# Patient Record
Sex: MALE | Race: WHITE
[De-identification: names, ages, dates, MRNs, and addresses within clinical notes are randomized per-mention and may not be internally consistent; named-entity substitution may affect disease eponyms.]

---

## 2018-08-29 ENCOUNTER — HOSPITAL ENCOUNTER (EMERGENCY)
Dept: HOSPITAL 56 - MW.ED | Age: 43
Discharge: HOME | End: 2018-08-29
Payer: COMMERCIAL

## 2018-08-29 VITALS — DIASTOLIC BLOOD PRESSURE: 106 MMHG | SYSTOLIC BLOOD PRESSURE: 164 MMHG
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
## 2018-08-29 DIAGNOSIS — S60.222A: Primary | ICD-10-CM

## 2018-08-29 DIAGNOSIS — Z79.899: ICD-10-CM
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
## 2018-08-29 NOTE — CR
EXAMINATION: Left hand

 

HISTORY: Pain

 

COMPARISON: None

 

TECHNIQUE: 2 views

 

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 The re
maining osseous structures and joint spaces appear preserved. Bone mineralization is otherwise normal
. No foreign body.

 

IMPRESSION: No acute osseous abnormality identified.

## 2018-08-29 NOTE — EDM.PDOC
ED HPI GENERAL MEDICAL PROBLEM





- General


Chief Complaint: Upper Extremity Injury/Pain


Stated Complaint: SMASHED LEFT HAND


Time Seen by Provider: 08/29/18 14:41


Source of Information: Reports: Patient


History Limitations: Reports: No Limitations





- History of Present Illness


INITIAL COMMENTS - FREE TEXT/NARRATIVE: 





HISTORY AND PHYSICAL:





History of present illness:


Patient is a 43-year-old male here with complaint of left hand pain. He states 

that he works for [employer] and they were doing a search warrant last 

night. He states they're using a gualberto to open a door and as he was doing that he 

hit his left hand on the door is open. He is otherwise in his usual state of 

health and denies any nausea, vomiting, diarrhea, fever, chills, chest pain, 

shortness of breath. He has not taken anything OTC for his symptoms.





Review of systems: 


As per history of present illness and below otherwise all systems reviewed and 

negative.





Past medical history: 


As per history of present illness and as reviewed below otherwise 

noncontributory.





Surgical history: 


As per history of present illness and as reviewed below otherwise 

noncontributory.





Social history: 


No reported history of drug or alcohol abuse.





Family history: 


As per history of present illness and as reviewed below otherwise 

noncontributory.





Physical exam:


General: Patient sitting comfortably in no acute distress and nontoxic appearing


HEENT: Atraumatic, normocephalic, pupils reactive, negative for conjunctival 

pallor or scleral icterus, mucous membranes moist, throat clear, neck supple, 

nontender, trachea midline. No meningeal signs. 


Lungs: Clear to auscultation, breath sounds equal bilaterally, chest nontender.


Heart: S1S2, regular, negative for clicks, rubs, or overt murmur.


Extremities: Skin is intact. There is a quarter size area of ecchymosis over 

the distal fourth and fifth metacarpal on the dorsal hand. He has pain to 

palpation in this area. He has normal ROM at MCPs, DIPs, PIPs, and wrist.  

negative for cords or calf pain. Neurovascular unremarkable.


Neuro: Awake, alert, oriented. Cranial nerves II through XII unremarkable. 

Cerebellum unremarkable. Motor and sensory unremarkable throughout. Exam 

nonfocal.





Notes: 





Diagnostics:


Left hand x-ray 





Therapeutics:


None





Prescriptions:


None





Impression: 


Left hand injury/contusion





Plan:


1. Ice, elevate, and take motrin as needed. 


2. Please follow up in the clinic for a work comp follow up. 


3. Return to ED as needed as discussed. 





Definitive disposition and diagnosis as appropriate pending reevaluation and 

review of above.





  ** Left Hand


Pain Score (Numeric/FACES): 3





- Related Data


 Allergies











Allergy/AdvReac Type Severity Reaction Status Date / Time


 


No Known Allergies Allergy   Verified 02/13/17 12:42











Home Meds: 


 Home Meds





Albuterol [Proventil HFA] 6.7 gm INH Q4H #1 inhaler 02/13/17 [Rx]


Mesalamine [Lialda] 1 tab PO DAILY 02/13/17 [History]











Past Medical History


HEENT History: Reports: Sinusitis


Cardiovascular History: Reports: None


Respiratory History: Reports: Asthma


Gastrointestinal History: Reports: Other (See Below)


Other Gastrointestinal History: ulcerative colitis


Genitourinary History: Reports: None


Musculoskeletal History: Reports: None


Neurological History: Reports: None


Psychiatric History: Reports: None


Endocrine/Metabolic History: Reports: None


Hematologic History: Reports: None


Immunologic History: Reports: None


Oncologic (Cancer) History: Reports: None


Dermatologic History: Reports: None





- Infectious Disease History


Infectious Disease History: Reports: Chicken Pox





- Past Surgical History


HEENT Surgical History: Reports: Other (See Below)





Social & Family History





- Family History


Family Medical History: Noncontributory





- Caffeine Use


Caffeine Use: Reports: Coffee


Caffeine Use Comment: 3 drinks/day





Review of Systems





- Review of Systems


Review Of Systems: ROS reveals no pertinent complaints other than HPI.





ED EXAM, GENERAL





- Physical Exam


Exam: See Below (see dictation)





Course





- Vital Signs


Last Recorded V/S: 


 Last Vital Signs











Temp  36.7 C   08/29/18 14:32


 


Pulse  82   08/29/18 14:32


 


Resp  18   08/29/18 14:32


 


BP  171/101 H  08/29/18 14:32


 


Pulse Ox  100   08/29/18 14:32














Departure





- Departure


Time of Disposition: 15:18


Disposition: Home, Self-Care 01


Condition: Good


Clinical Impression: 


 Contusion of left hand








- Discharge Information


Referrals: 


PCP,None [Primary Care Provider] - 


Forms:  ED Department Discharge


Additional Instructions: 


The following information is given to patients seen in the emergency department 

who are being discharged to home. This information is to outline your options 

for follow-up care. We provide all patients seen in our emergency department 

with a follow-up referral.





The need for follow-up, as well as the timing and circumstances, are variable 

depending upon the specifics of your emergency department visit.





If you don't have a primary care physician on staff, we will provide you with a 

referral. We always advise you to contact your personal physician following an 

emergency department visit to inform them of the circumstance of the visit and 

for follow-up with them and/or the need for any referrals to a consulting 

specialist.





The emergency department will also refer you to a specialist when appropriate. 

This referral assures that you have the opportunity for follow-up care with a 

specialist. All of these measure are taken in an effort to provide you with 

optimal care, which includes your follow-up.





Under all circumstances we always encourage you to contact your private 

physician who remains a resource for coordinating your care. When calling for 

follow-up care, please make the office aware that this follow-up is from your 

recent emergency room visit. If for any reason you are refused follow-up, 

please contact the [facility] Emergency 

Department at [phone] and asked to speak to the emergency department 

charge nurse.





[facility]


Primary Care


[address] [place] [id number]


Phone: [phone]


Fax: [phone]





[facility]


[address] [place] [address]


Phone: [phone]


Fax: [phone]





1. Ice, elevate, and take motrin as needed. 


2. Please follow up in the clinic for a work comp follow up. 


3. Return to ED as needed as discussed.

## 2020-12-09 NOTE — PCM.OPNOTE
- General Post-Op/Procedure Note


Date of Surgery/Procedure: 12/09/20


Operative Procedure(s): Right knee arthroscopy and partial medial meniscectomy


Findings: 


Lateral compartment: Lateral meniscus intact, minimal grade 1 changes femoral 

and tibial articular cartilage


Notch: Anterior cruciate ligament intact


Medial compartment: Undersurface tear of the medial meniscus posterior third 

body, grade 1-2 cartilage changes posterior aspect of tibial cartilage in the 

region of the meniscal tear, minimal femoral cartilage changes


Patellofemoral compartment: Grade 2 cobblestone changes of the patellar ridge 

and grade 1 changes of the trochlear groove articular cartilage


Pre Op Diagnosis: Right knee medial meniscus tear


Post-Op Diagnosis: Right knee medial meniscus tear


Anesthesia Technique: General ET Tube


Primary Surgeon: Mateusz Colbert


Assistant: Mae Bahena Assistant Was Necessary: 


Positioning


Pathology: 


None


EBL in mLs: 5


Complications: None


Free Text/Narrative:: 


Patient is a 45-year-old male with right knee pain.  He has a documented right 

medial meniscus tear documented by MRI.  We discussed the risks and benefits of 

surgery for knee arthroscopy and partial medial meniscectomy.  All questions 

were answered.  Patient consented to proceed with surgery.





Patient was taken to the operating room.  After adequate general anesthesia, he 

remained in a supine position.  Tourniquet was placed around the right proximal 

thigh.  The right lower extremities prepped draped in usual sterile manner.





The leg was elevated and the tourniquet inflated.  Arthroscopy was performed 

with superior medial drain portal medial and lateral parapatellar arthroscopy 

portals and accessory medial working portals.  The above diagnostic arthroscopy 

findings are noted.





Partial medial meniscectomy was performed with punches and a shaver back to a 

stable rim.  The stable rim was confirmed with probing.





Fluid was expressed from the joint, Marcaine injected into the subcutaneous 

tissue but not the joint, portals were closed with nylon sutures.  Sterile 

dressing was applied.  Patient was accompanied the cover room in stable 

condition.





Pain management: Ibuprofen, acetaminophen, hydrocodone.


Venous thromboembolism prophylaxis: Not indicated for knee arthroscopy.


Prophylactic antibiotics: Not indicated for outpatient procedure.


Restrictions: Patient is weightbearing as tolerated on his right lower extremity

with assistive device as needed.  He has no specific restrictions and may resume

activities as tolerated.  He is off work until Monday, December 14, 2020 when he

may return to work light duty.

## 2020-12-09 NOTE — [id number]
Post Anesthesia Note





- EVALUATION WITHIN 48HRS OF ANESTHETIC


Vital Signs in Normal Range: Yes


Patient Participated in Evaluation: Yes


Respiratory Function Stable: Yes


Airway Patent: Yes


Cardiovascular Function Stable: Yes


Hydration Status Stable: Yes


Pain Control Satisfactory: Yes


Nausea and Vomiting Control Satisfactory: Yes


Mental Status Recovered: Yes


Vital Signs: 


                                Last Vital Signs











Temp  36.4 C   12/09/20 07:53


 


Pulse  56 L  12/09/20 09:58


 


Resp  10 L  12/09/20 09:58


 


BP  124/79   12/09/20 09:58


 


Pulse Ox  95   12/09/20 09:58














- COMMENTS/OBSERVATIONS


Free Text/Narrative:: 





No anesthesia problems

## 2020-12-09 NOTE — PCM.POSTAN
POST ANESTHESIA ASSESSMENT





- MENTAL STATUS


Mental Status: Alert, Oriented





- VITAL SIGNS


Vital Signs: 


                                Last Vital Signs











Temp  36.4 C   12/09/20 07:53


 


Pulse  56 L  12/09/20 09:58


 


Resp  10 L  12/09/20 09:58


 


BP  124/79   12/09/20 09:58


 


Pulse Ox  95   12/09/20 09:58














- RESPIRATORY


Respiratory Status: Respiratory Rate WNL, Airway Patent, O2 Saturation Stable





- CARDIOVASCULAR


CV Status: Pulse Rate WNL, Blood Pressure Stable





- GASTROINTESTINAL


GI Status: No Symptoms





- PAIN


Pain Score: 2





- POST OP HYDRATION


Hydration Status: Adequate & Stable





- OBSERVATIONS


Free Text/Narrative:: 





No anesthesia problems

## 2020-12-09 NOTE — PCM.PREANE
Preanesthetic Assessment





- Anesthesia/Transfusion/Family Hx


Anesthesia History: Prior Anesthesia Without Reaction


Family History of Anesthesia Reaction: No


Transfusion History: No Prior Transfusion(s)


Intubation History: Unknown





- Review of Systems


General: No Symptoms


Pulmonary: No Symptoms


Cardiovascular: No Symptoms


Gastrointestinal: No Symptoms


Neurological: No Symptoms


Other: Reports: None





- Physical Assessment


Height: 6 ft 2 in


Weight: 114.759 kg


ASA Class: 2


Mental Status: Alert & Oriented x3


Airway Class: Mallampati = 2


Dentition: Reports: Normal Dentition


Thyro-Mental Finger Breadths: 3


Mouth Opening Finger Breadths: 3


ROM/Head Extension: Full


Lungs: Clear to Auscultation, Normal Respiratory Effort


Cardiovascular: Regular Rate, Regular Rhythm





- Lab


Values: 





                             Laboratory Last Values











SARS-CoV-2 RNA (BEATA)  NEGATIVE  (NEGATIVE)   12/09/20  06:00    














- Allergies


Allergies/Adverse Reactions: 


                                    Allergies











Allergy/AdvReac Type Severity Reaction Status Date / Time


 


No Known Allergies Allergy   Verified 12/03/20 13:37














- Blood


Blood Available: No





- Anesthesia Plan


Pre-Op Medication Ordered: None





- Acknowledgements


Anesthesia Type Planned: General Anesthesia


Pt an Appropriate Candidate for the Planned Anesthesia: Yes


Alternatives and Risks of Anesthesia Discussed w Pt/Guardian: Yes


Pt/Guardian Understands and Agrees with Anesthesia Plan: Yes





PreAnesthesia Questionnaire


HEENT History: Reports: Sinusitis


Cardiovascular History: Reports: Hypertension


Respiratory History: Reports: Asthma (exercize induced)


Gastrointestinal History: Reports: Other (See Below)


Other Gastrointestinal History: ulcerative colitis


Genitourinary History: Reports: None


Musculoskeletal History: Reports: Fracture


Other Musculoskeletal History: hx fx hand


Neurological History: Reports: Migraines


Psychiatric History: Reports: None


Endocrine/Metabolic History: Reports: Obesity/BMI 30+ (BMI 32.5)


Hematologic History: Reports: None


Immunologic History: Reports: None


Oncologic (Cancer) History: Reports: None


Dermatologic History: Reports: None





- Infectious Disease History


Infectious Disease History: Reports: None





- Past Surgical History


Head Surgeries/Procedures: Reports: None


HEENT Surgical History: Reports: Naso-Sinus Surgery


Cardiovascular Surgical History: Reports: None


Respiratory Surgical History: Reports: None


GI Surgical History: Reports: Other (See Below)


Other GI Surgeries/Procedures: hemorrhoidectomy


Male  Surgical History: Reports: None


Endocrine Surgical History: Reports: None


Neurological Surgical History: Reports: None


Musculoskeletal Surgical History: Reports: None


Oncologic Surgical History: Reports: None


Dermatological Surgical History: Reports: Other (See Below)





- SUBSTANCE USE


Tobacco Use Status *Q: Former Tobacco User


Tobacco Use Within Last Twelve Months: Smokeless Tobacco, Other (See Below)





- HOME MEDS


Home Medications: 


                                    Home Meds





Albuterol [Proventil HFA] 2 puff INH Q4H PRN 07/06/19 [History]


Fluticasone Propionate [Flonase Allergy Relief] 1 spray NASBOTH DAILY 07/06/19 

[History]


Lisinopril 40 mg PO DAILY 07/06/19 [History]


Montelukast Sodium [Singulair] 10 mg PO ASDIRECTED 07/06/19 [History]


Fexofenadine/Pseudoephedrine [Allegra-D 24 Hour Tablet] 1 tab PO ASDIRECTED 

12/03/20 [History]


Zinc Gluconate [Zinc] 1 tab PO DAILY 12/03/20 [History]











- CURRENT (IN HOUSE) MEDS


Current Meds: 





                               Current Medications





Lactated Ringer's (Ringers, Lactated)  1,000 mls @ 100 mls/hr IV ASDIRECTED [facility]


Cefazolin Sodium/Dextrose 2 gm (/ Premix)  50 mls @ 100 mls/hr IV ONCALL [facility]





Discontinued Medications





Fentanyl (Sublimaze) Confirm Administered Dose 100 mcg .ROUTE .STK-MED ONE


   Stop: 12/09/20 07:17


Bupivacaine HCl/Epinephrine Bitart (Sensorc Mpf 0.25%-Epi [id number]) Confirm 

Administered Dose 30 mls @ as directed .ROUTE .STK-MED ONE


   Stop: 12/09/20 07:34


Lidocaine HCl (Xylocaine-Mpf 1%) Confirm Administered Dose 5 ml .ROUTE .STK-MED 

ONE


   Stop: 12/09/20 07:17


Midazolam HCl (Versed 1 Mg/Ml) Confirm Administered Dose 2 mg .ROUTE .STK-MED 

ONE


   Stop: 12/09/20 07:17


Ondansetron HCl (Zofran) Confirm Administered Dose 4 mg .ROUTE .STK-MED ONE


   Stop: 12/09/20 07:17


Propofol (Diprivan  20 Ml) Confirm Administered Dose 200 mg .ROUTE .STK-MED ONE


   Stop: 12/09/20 07:17


Rocuronium Bromide (Rocuronium Bromide) Confirm Administered Dose 50 mg .ROUTE 

.STK-MED ONE


   Stop: 12/09/20 07:17

## 2021-08-11 NOTE — EDM.PDOC
ED HPI GENERAL MEDICAL PROBLEM





- General


Chief Complaint: Syncope


Stated Complaint: SPOKE W/ NURSE


Time Seen by Provider: 08/11/21 17:45





- History of Present Illness


INITIAL COMMENTS - FREE TEXT/NARRATIVE: 





HISTORY AND PHYSICAL:





History of present illness:


This a 46-year-old gentleman with history significant for hypertension who 

presents to the ER today secondary to a witnessed syncopal episode.  Patient was

here with his son in room 9 assisting with a laceration to his son's ankle.  

Patient had assisted by holding his son's hands during  both anesthetizing the 

wound as well as suturing of the wound.  After the wound had been sutured, the 

patient was sitting with his son for approximately 10 minutes when his son 

started screaming for help.  The son reports that his father became 

unresponsive, his eyes rolled up in his head and was twitching up and down a 

little bit.  Patient was sitting in his chair when this occurred next his son.  

He did not fall out of the chair and did not incur any injury or trauma to his 

head.  Nursing staff and myself were immediately present and were able to assist

patient down slowly to the floor where he became more responsive over 

approximately a 30 to 45-second period.  Patient reports that he denies any 

recent fevers, shakes, chills, nausea, vomiting, diarrhea, dysuria, frequency, 

urgency, chest pain, shortness of breath, abdominal pain, pain rating to his 

arms or other back.  Patient reports that he just felt extremely warm and 

flushed and then next he recalls is being on the floor with us around him.  

Patient denies any history of heart disease, CAD, heart failure, strokes.  

Patient denies any history of diabetes, liver, lung, kidney problems.  Patient 

denies any prior abdominal or chest surgeries.  Patient denies any tobacco 

alcohol or drugs but he does chew tobacco.











Review of systems: 


As per history of present illness and below otherwise all systems reviewed and 

negative.





Past medical history: 


As per history of present illness and as reviewed below otherwise 

noncontributory.





Surgical history: 


As per history of present illness and as reviewed below otherwise 

noncontributory.





Social history: 


No reported history of drug abuse.





Family history: 


As per history of present illness and as reviewed below otherwise 

noncontributory.





Physical exam:





This patient was seen and evaluated during the 2020 SARS-CoV-2 novel coronavirus

pandemic period.  Community viral transmission is ongoing at time of this 

encounter and the emergency department is operating under pandemic response 

procedures.





Constitutional: Patient is oriented to person, place, and time.  Appears well-

developed and well-nourished.  No distress.


 HEENT: Moist mucous membranes


 Head: Normocephalic and atraumatic


 Eyes: Right eye exhibits no discharge.  Left eye exhibits no discharge.  No 

scleral icterus


 Neck: Normal range of motion.  No tracheal deviation present.


 Cardiovascular: Normal rate and regular rhythm.  No murmurs gallops or rubs


 Pulmonary: Effort normal, no respiratory distress.  No wheezing rales or 

rhonchi


Abd:  Soft, nondistended, no rebound/guarding, no psoas or obturator signs, no 

tenderness at Mcberney's point, no Richard's sign.  Pt does not present with an 

exam that would be consistent with an acute surgical abdomen at this time


 Musculoskeletal: Normal range of motion


 Neurologic: Alert and oriented to person, place and time.


 Skin: Pink, warm and diaphoretic.


 Psychiatric: Normal mood and affect.  Behavior is normal.  Judgment and thought

content normal.


 Nursing note and vital signs have been reviewed





Patient extremely diaphoretic upon arrival with unresponsive behavior and 

shallow respirations.  This lasted for approximately 30 seconds and patient 

became more alert awake and back to baseline within 60 seconds of the syncopal 

episode.


Diagnostics:


[]





Therapeutics:


[]





Assessment and plan:


This is a 46-year-old gentleman who presents to the ER today with a witnessed 

syncopal episode while he was here in the ED with a son secondary to a 

laceration to his son's left ankle.  Patient reports that he that he did have 1 

episode of syncope several years ago when he saw blood with his mother.  Patient

 reports that today he only had a couple coffee for breakfast and couple apples 

with caramel for lunch.  He reports he did not drink much fluids today or eat 

anything else.





In the ED, the patient's blood sugar was greater than 100.


Patient's blood pressure immediately after the syncopal episode while he was on 

the ground was markedly elevated at approximately 180/110.


Patient's EKG did not reveal any evidence of arrhythmias, STEMI, abnormal 

intervals.  


EKG:





EKG date August 11, 2021 at 5:40 PM


As interpreted by ER physician: Merissa:


Nonspecific ST-T wave abnormalities


Normal axis


No evidence of ST elevation MI


Normal sinus rhythm heart rate of 91





Patient will have labs drawn including a CBC, CMP, electrolytes, troponin.  

Patient was given IV fluids here in the ED and will be reevaluated.








6:46 PM: Patient was reevaluated in the ED.  Patient's labs are all within 

normal limits.  Patient currently is alert awake and orient x3 and has no 

further episodes of diaphoresis.  Patient is ambulating in the ED with stable 

gait.  Patient's blood pressure is 140/85 with a heart rate of 72.  I have given

 the patient the option for admission for further evaluation however he reports 

that he feels extremely comfortable and does not wish to be admitted to the 

hospital and would rather be discharged home.  This is most likely a vasovagal 

episode secondary to a combination of decreased p.o. intake today, dehydration, 

and the situation with his son getting sutured with blood exposure.





Definitive disposition and diagnosis as appropriate pending reevaluation and 

review of above.











- Related Data


                                    Allergies











Allergy/AdvReac Type Severity Reaction Status Date / Time


 


No Known Allergies Allergy   Verified 08/11/21 17:46











Home Meds: 


                                    Home Meds





Albuterol [Proventil HFA] 2 puff INH Q4H PRN 07/06/19 [History]


Lisinopril 40 mg PO DAILY 07/06/19 [History]


Montelukast Sodium [Singulair] 10 mg PO ASDIRECTED 07/06/19 [History]


Fexofenadine/Pseudoephedrine [Allegra-D 24 Hour Tablet] 1 tab PO ASDIRECTED 

12/03/20 [History]











Past Medical History


HEENT History: Reports: Sinusitis


Cardiovascular History: Reports: Hypertension


Respiratory History: Reports: Asthma


Gastrointestinal History: Reports: Other (See Below)


Other Gastrointestinal History: ulcerative colitis


Genitourinary History: Reports: None


Musculoskeletal History: Reports: Fracture


Other Musculoskeletal History: hx fx hand


Neurological History: Reports: Migraines


Psychiatric History: Reports: None


Endocrine/Metabolic History: Reports: Obesity/BMI 30+


Hematologic History: Reports: None


Immunologic History: Reports: None


Oncologic (Cancer) History: Reports: None


Dermatologic History: Reports: None





- Infectious Disease History


Infectious Disease History: Reports: None





- Past Surgical History


Head Surgeries/Procedures: Reports: None


HEENT Surgical History: Reports: Naso-Sinus Surgery


Cardiovascular Surgical History: Reports: None


Respiratory Surgical History: Reports: None


GI Surgical History: Reports: Other (See Below)


Other GI Surgeries/Procedures: hemorrhoidectomy


Male  Surgical History: Reports: None


Endocrine Surgical History: Reports: None


Neurological Surgical History: Reports: None


Musculoskeletal Surgical History: Reports: None


Oncologic Surgical History: Reports: None


Dermatological Surgical History: Reports: Other (See Below)





Social & Family History





- Family History


Family Medical History: No Pertinent Family History





- Caffeine Use


Caffeine Use: Reports: Coffee


Caffeine Use Comment: 3 drinks/day





ED ROS GENERAL





- Review of Systems


Review Of Systems: See Below





ED EXAM, GENERAL





- Physical Exam


Exam: See Below





Course





- Vital Signs


Last Recorded V/S: 


                                Last Vital Signs











Temp  95.9 F L  08/11/21 19:34


 


Pulse  79   08/11/21 19:34


 


Resp  18   08/11/21 19:34


 


BP  141/93 H  08/11/21 19:34


 


Pulse Ox  100   08/11/21 19:34














- Orders/Labs/Meds


Labs: 


                                Laboratory Tests











  08/11/21 08/11/21 Range/Units





  17:50 17:50 


 


WBC  11.36 H   (4.0-11.0)  K/uL


 


RBC  5.58   (4.50-5.90)  M/uL


 


Hgb  17.3 H   (13.0-17.0)  g/dL


 


Hct  49.3   (38.0-50.0)  %


 


MCV  88.4   (80.0-98.0)  fL


 


MCH  31.0   (27.0-32.0)  pg


 


MCHC  35.1   (31.0-37.0)  g/dL


 


RDW Std Deviation  39.5   (28.0-62.0)  fl


 


RDW Coeff of Liz  12   (11.0-15.0)  %


 


Plt Count  288   (150-400)  K/uL


 


MPV  10.00   (7.40-12.00)  fL


 


Neut % (Auto)  56.3   (48.0-80.0)  %


 


Lymph % (Auto)  30.7   (16.0-40.0)  %


 


Mono % (Auto)  9.7   (0.0-15.0)  %


 


Eos % (Auto)  2.7   (0.0-7.0)  %


 


Baso % (Auto)  0.6   (0.0-1.5)  %


 


Neut # (Auto)  6.4 H   (1.4-5.7)  K/uL


 


Lymph # (Auto)  3.5 H   (0.6-2.4)  K/uL


 


Mono # (Auto)  1.1 H   (0.0-0.8)  K/uL


 


Eos # (Auto)  0.3   (0.0-0.7)  K/uL


 


Baso # (Auto)  0.1   (0.0-0.1)  K/uL


 


Nucleated RBC %  0.0   /100WBC


 


Nucleated RBCs #  0   K/uL


 


Sodium   140  (136-148)  mmol/L


 


Potassium   3.3 L  (3.5-5.1)  mmol/L


 


Chloride   101  ()  mmol/L


 


Carbon Dioxide   22.7  (21.0-32.0)  mmol/L


 


BUN   12  (7.0-18.0)  mg/dL


 


Creatinine   1.5 H  (0.8-1.3)  mg/dL


 


Est Cr Clr Drug Dosing   71.54  mL/min


 


Estimated GFR (MDRD)   50.4  ml/min


 


Glucose   102  ()  mg/dL


 


Calcium   8.8  (8.5-10.1)  mg/dL


 


Magnesium   2.0  (1.8-2.4)  mg/dL


 


Total Bilirubin   0.7  (0.2-1.0)  mg/dL


 


AST   34  (15-37)  IU/L


 


ALT   60  (14-63)  IU/L


 


Alkaline Phosphatase   80  ()  U/L


 


Troponin I   < 0.050  (0.000-0.056)  ng/mL


 


Total Protein   7.6  (6.4-8.2)  g/dL


 


Albumin   4.2  (3.4-5.0)  g/dL


 


Globulin   3.4  (2.6-4.0)  g/dL


 


Albumin/Globulin Ratio   1.2  (0.9-1.6)  











Meds: 


Medications














Discontinued Medications














Generic Name Dose Route Start Last Admin





  Trade Name Freq  PRN Reason Stop Dose Admin


 


Sodium Chloride  1,000 mls @ 999 mls/hr  08/11/21 17:47  08/11/21 17:58





  Normal Saline  IV  08/11/21 18:47  999 mls/hr





  .Bolus ONE   Administration


 


Sodium Chloride  10 ml  08/11/21 17:47  08/11/21 17:58





  Sodium Chloride 0.9% 10 Ml Syringe  FLUSH   10 ml





  ASDIRECTED PRN   Administration





  Keep Vein Open  


 


Sodium Chloride  2.5 ml  08/11/21 17:47  08/11/21 17:58





  Sodium Chloride 0.9% 2.5 Ml Syringe  FLUSH   2.5 ml





  ASDIRECTED PRN   Administration





  Keep Vein Open  














Departure





- Departure


Time of Disposition: 19:19


Disposition: Home, Self-Care 01


Condition: Good


Clinical Impression: 


 Vasovagal syncope








- Discharge Information


Instructions:  Dehydration, Adult, Easy-to-Read, Syncope, Easy-to-Read


Referrals: 


PCP,None [Primary Care Provider] - 


Forms:  ED Department Discharge


Additional Instructions: 


You were seen and evaluated in the ER today secondary to passing out while your 

son was being evaluated for a laceration to his ankle.  All your blood tests 

were within normal limits.  Your EKG looked normal.  Your heart enzymes were 

within normal limits.  As we discussed, your kidney function is slightly 

elevated which is likely related to your longstanding hypertension.  When 

comparing your kidney function to prior results, it appears to not have changed 

very much.  This definitely needs to be further worked up by your family doctor 

to make sure your blood pressure is well controlled.





Please make an appointment to follow-up with your family doctor in the next 1 to

2 days for reevaluation.





The following information is given to patients seen in the emergency department 

who are being discharged to home. This information is to outline your options 

for follow-up care. We provide all patients seen in our emergency department 

with a follow-up referral.





The need for follow-up, as well as the timing and circumstances, are variable 

depending upon the specifics of your emergency department visit.





If you don't have a primary care physician on staff, we will provide you with a 

referral. We always advise you to contact your personal physician following an 

emergency department visit to inform them of the circumstance of the visit and 

for follow-up with them and/or the need for any referrals to a consulting 

specialist.





The emergency department will also refer you to a specialist when appropriate. 

This referral assures that you have the opportunity for follow-up care with a 

specialist. All of these measure are taken in an effort to provide you with 

optimal care, which includes your follow-up.





Under all circumstances we always encourage you to contact your private 

physician who remains a resource for coordinating your care. When calling for 

follow-up care, please make the office aware that this follow-up is from your 

recent emergency room visit. If for any reason you are refused follow-up, please

contact the [facility] Emergency Department

at [phone] and asked to speak to the emergency department charge nurse.





[facility]


[address] [place] [id number]


Phone: [phone]


Fax: [phone]








[address] [address]


Phone: [phone]


Fax: [phone]

## 2021-08-11 NOTE — CR
INDICATION: syncope



TECHNIQUE:



Chest 1 view. 



COMPARISION:



7/6/19



FINDINGS:



Cardiovascular and mediastinum: Heart size and vasculature are normal in 

caliber and appearance.  Mediastinum is within normal limits.  



Lungs and pleural space: Lungs are clear.  No sign of infiltrate or mass.  

No sign of pleural effusion.  No pneumothorax.  



Bones and soft tissues: No significant findings.  



IMPRESSION:



Unremarkable chest.



Dictated by: Figueroa Wu MD @ 08/11/2021 18:36:56



(Electronically Signed)